# Patient Record
Sex: MALE
[De-identification: names, ages, dates, MRNs, and addresses within clinical notes are randomized per-mention and may not be internally consistent; named-entity substitution may affect disease eponyms.]

---

## 2018-02-18 ENCOUNTER — HOSPITAL ENCOUNTER (EMERGENCY)
Dept: HOSPITAL 91 - E/R | Age: 23
Discharge: HOME | End: 2018-02-18
Payer: COMMERCIAL

## 2018-02-18 ENCOUNTER — HOSPITAL ENCOUNTER (EMERGENCY)
Age: 23
Discharge: HOME | End: 2018-02-18

## 2018-02-18 DIAGNOSIS — Z76.0: Primary | ICD-10-CM

## 2018-02-18 PROCEDURE — 99281 EMR DPT VST MAYX REQ PHY/QHP: CPT

## 2018-04-13 ENCOUNTER — HOSPITAL ENCOUNTER (EMERGENCY)
Age: 23
LOS: 1 days | Discharge: HOME | End: 2018-04-14

## 2018-04-13 ENCOUNTER — HOSPITAL ENCOUNTER (EMERGENCY)
Dept: HOSPITAL 91 - FTE | Age: 23
LOS: 1 days | Discharge: HOME | End: 2018-04-14
Payer: COMMERCIAL
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
## 2018-04-13 DIAGNOSIS — Z23: ICD-10-CM

## 2018-04-13 DIAGNOSIS — S01.01XA: Primary | ICD-10-CM

## 2018-04-13 DIAGNOSIS — J45.909: ICD-10-CM
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
## 2018-04-13 PROCEDURE — 90471 IMMUNIZATION ADMIN: CPT

## 2018-04-13 PROCEDURE — 12002 RPR S/N/AX/GEN/TRNK2.6-7.5CM: CPT

## 2018-04-13 PROCEDURE — 99283 EMERGENCY DEPT VISIT LOW MDM: CPT

## 2018-04-13 PROCEDURE — 90715 TDAP VACCINE 7 YRS/> IM: CPT

## 2018-04-14 RX ADMIN — CLOSTRIDIUM TETANI TOXOID ANTIGEN (FORMALDEHYDE INACTIVATED), CORYNEBACTERIUM DIPHTHERIAE TOXOID ANTIGEN (FORMALDEHYDE INACTIVATED), BORDETELLA PERTUSSIS TOXOID ANTIGEN (GLUTARALDEHYDE INACTIVATED), BORDETELLA PERTUSSIS FILAMENTOUS HEMAGGLUTININ ANTIGEN (FORMALDEHYDE INACTIVATED), BORDETELLA PERTUSSIS PERTACTIN ANTIGEN, AND BORDETELLA PERTUSSIS FIMBRIAE 2/3 ANTIGEN 1 ML: 5; 2; 2.5; 5; 3; 5 INJECTION, SUSPENSION INTRAMUSCULAR at 01:50

## 2018-04-20 ENCOUNTER — HOSPITAL ENCOUNTER (EMERGENCY)
Age: 23
LOS: 1 days | Discharge: HOME | End: 2018-04-21

## 2018-04-20 ENCOUNTER — HOSPITAL ENCOUNTER (EMERGENCY)
Dept: HOSPITAL 91 - FTE | Age: 23
LOS: 1 days | Discharge: HOME | End: 2018-04-21
Payer: COMMERCIAL

## 2018-04-20 DIAGNOSIS — M25.561: Primary | ICD-10-CM

## 2018-04-20 DIAGNOSIS — J45.909: ICD-10-CM

## 2018-04-20 PROCEDURE — 99283 EMERGENCY DEPT VISIT LOW MDM: CPT

## 2019-02-21 ENCOUNTER — HOSPITAL ENCOUNTER (EMERGENCY)
Dept: HOSPITAL 91 - FTE | Age: 24
LOS: 1 days | Discharge: HOME | End: 2019-02-22
Payer: COMMERCIAL

## 2019-02-21 ENCOUNTER — HOSPITAL ENCOUNTER (EMERGENCY)
Dept: HOSPITAL 10 - FTE | Age: 24
LOS: 1 days | Discharge: HOME | End: 2019-02-22
Payer: COMMERCIAL

## 2019-02-21 VITALS
WEIGHT: 190.26 LBS | HEIGHT: 67 IN | BODY MASS INDEX: 29.86 KG/M2 | WEIGHT: 190.26 LBS | HEIGHT: 67 IN | BODY MASS INDEX: 29.86 KG/M2

## 2019-02-21 VITALS — RESPIRATION RATE: 18 BRPM | SYSTOLIC BLOOD PRESSURE: 150 MMHG | DIASTOLIC BLOOD PRESSURE: 57 MMHG | HEART RATE: 91 BPM
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
## 2019-02-21 DIAGNOSIS — S69.91XA: Primary | ICD-10-CM
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
## 2019-02-21 DIAGNOSIS — J45.909: ICD-10-CM

## 2019-02-21 PROCEDURE — 29125 APPL SHORT ARM SPLINT STATIC: CPT

## 2019-02-21 PROCEDURE — 99283 EMERGENCY DEPT VISIT LOW MDM: CPT

## 2019-02-21 PROCEDURE — 73130 X-RAY EXAM OF HAND: CPT

## 2019-02-21 PROCEDURE — 73110 X-RAY EXAM OF WRIST: CPT

## 2019-02-21 RX ADMIN — KETOROLAC TROMETHAMINE 1 MG: 30 INJECTION, SOLUTION INTRAMUSCULAR at 22:01

## 2019-02-21 NOTE — ERD
ER Documentation


Chief Complaint


Chief Complaint





right  thumb pain x 3 hrs, hit while palying basketball.





HPI


This is a 23-year-old male who presents emergency department with complaints of 


right thumb pain stated that he started.  After accidentally being hit while 


playing basketball 3 hours prior to arrival here in the emergency department.





Denies headache, head injury, loss of consciousness, dizziness, neck pain, neck 


stiffness, throat pain, difficulty swallowing, difficulty breathing lying flat, 


shoulder pain, chest pain, back pain, abdominal pain, nausea, vomiting, 


constipation, diarrhea, urinary symptoms, loss of bowel and bladder control, 


difficulty walking due to pain, numbness or tingling sensation, calf pain, 


recent travel, recent major surgery in the last 3 weeks, calf pain, recent long 


travel, recent exposure to any illness, recent antibiotic use in the last 3 


months, fever, chills, seizures.





Past medical history: Denies.


Surgical history: Denies.


Social: Denies smoking, use of alcoholic beverages, use of illegal drugs.





ROS


All systems reviewed and are negative except as per history of present illness.





Medications


Home Meds


Active Scripts


Ibuprofen* (Motrin*) 800 Mg Tab, 800 MG PO Q6H PRN for PAIN AND OR ELEVATED 


TEMP, #30 TAB


   Prov:PASILABANLISAAR F         2/21/19


Naproxen* (Naprosyn*) 500 Mg Tablet, 500 MG PO BID PRN for PAIN AND/OR 


INFLAMMATION, #30 TAB


   Prov:RINAELAINAEDOUARD         4/21/18


Ibuprofen* (Motrin*) 600 Mg Tab, 600 MG PO Q6H PRN for PAIN AND OR ELEVATED 


TEMP, #30 TAB


   Prov:HORTENCIA IRELAND NP         4/14/18


Albuterol Sulfate* (Proair HFA*) 8.5 Gm Hfa.aer.ad, 2 PUFF INH Q4H PRN for 


WHEEZING AND SOB, #1 INHALER


   Prov:JASSON AYOUB PA-C         2/18/18





Allergies


Allergies:  


Coded Allergies:  


     No Known Allergy (Unverified , 4/20/18)





PMhx/Soc


History of Surgery:  Yes (HERNIA REPAIR, R lateral meniscus)


Anesthesia Reaction:  No


Hx Respiratory Disorders:  Yes (ASTHMA)


Hx Alcohol Use:  No


Hx Substance Use:  No


Hx Tobacco Use:  No


Smoking Status:  Never smoker





Physical Exam


Vitals





Vital Signs


  Date      Temp  Pulse  Resp  B/P (MAP)   Pulse Ox  O2          O2 Flow    FiO2


Time                                                 Delivery    Rate


   2/21/19  98.5     91    18      150/57        98


     17:52                           (88)





Physical Exam


Const:   No acute distress


Head:   Atraumatic 


Eyes:    Normal Conjunctiva


ENT:    Normal External Ears, Nose and Mouth.


Neck:               Full range of motion. No meningismus.


Resp:   Clear to auscultation bilaterally


Cardio:   Regular rate and rhythm, no murmurs


Abd:    Soft, non tender, non distended. Normal bowel sounds


Skin:   No petechiae or rashes


Back:   No midline or flank tenderness


Ext:    No cyanosis, or edema.  Right wrist: Has good and full range of motion. 


Right thumb: Has mild snuffbox tenderness to palpation.  Right 


index/middle/ring/pinky fingers are unremarkable.  No neurovascular deficit.


Neur:   Awake and alert.  No neurological deficits.


Psych:    Normal Mood and Affect


Results 24 hrs





Current Medications


 Medications
   Dose
          Sig/Radha
       Start Time
   Status  Last


 (Trade)       Ordered        Route
 PRN     Stop Time              Admin
Dose


                              Reason                                Admin


 Ketorolac
     30 mg          ONCE  STAT
    2/21/19       DC       



Tromethamine
                 IM
            21:55



 (Toradol)                                   2/21/19 21:57








Procedures/MDM


Diagnostic tests:


X-ray of the right hand: No acute abnormality.


X-ray of the right wrist: Unremarkable right wrist x-ray series.





Treatment: Toradol IM.  Thumb spica splint.





Re-evaluation: No neurovascular deficit prior to and after the application of 


thumb spica splint.





Differential diagnosis


I have low suspicion for compartment syndrome, displaced fracture.





Final diagnosis: Thumb injury.  I cannot rule out scaphoid fracture and this is 


the reason why put the patient on thumb spica splint.





Prescription: Motrin.





Follow-up with PCP in the next 24-48 hours.  Follow-up with hand specialist in 


the next 24-48 hours.  Resources was also provided.  Come back here in the 


emergency department for any new symptoms or any worsening symptoms.  





All questions and concerns were answered.  Patient and family members verbalized


understanding and agreed with plan of care.  





Hemodynamically stable on discharge.





Departure


Diagnosis:  


   Primary Impression:  


   Finger injury


   Additional Impressions:  


   Thumb injury


   Hand injury


   Wrist injury


Condition:  Stable





Additional Instructions:  


Follow-up with PCP in the next 24-48 hours.  Follow-up with hand specialist in 


the next 24-48 hours.  Resources was also provided.  Come back here in the 


emergency department for any new symptoms or any worsening symptoms.











HANY ANDRADE               Feb 21, 2019 21:55